# Patient Record
Sex: FEMALE | Race: WHITE | Employment: FULL TIME | URBAN - METROPOLITAN AREA
[De-identification: names, ages, dates, MRNs, and addresses within clinical notes are randomized per-mention and may not be internally consistent; named-entity substitution may affect disease eponyms.]

---

## 2019-05-24 ENCOUNTER — HOSPITAL ENCOUNTER (OUTPATIENT)
Dept: MAMMOGRAPHY | Age: 54
Discharge: HOME | End: 2019-05-26
Payer: COMMERCIAL

## 2019-05-24 ENCOUNTER — HOSPITAL ENCOUNTER (OUTPATIENT)
Dept: ULTRASOUND IMAGING | Age: 54
Discharge: HOME | End: 2019-05-24
Payer: COMMERCIAL

## 2019-05-24 DIAGNOSIS — N63.10 LUMP OF RIGHT BREAST: ICD-10-CM

## 2019-05-24 PROCEDURE — G0279 TOMOSYNTHESIS, MAMMO: HCPCS

## 2019-05-24 PROCEDURE — 76642 ULTRASOUND BREAST LIMITED: CPT

## 2020-07-28 ENCOUNTER — HOSPITAL ENCOUNTER (OUTPATIENT)
Dept: MAMMOGRAPHY | Age: 55
Discharge: HOME | End: 2020-07-30
Payer: COMMERCIAL

## 2020-07-28 PROCEDURE — 77063 BREAST TOMOSYNTHESIS BI: CPT

## 2022-04-22 ENCOUNTER — HOSPITAL ENCOUNTER (OUTPATIENT)
Dept: MAMMOGRAPHY | Age: 57
Discharge: HOME | End: 2022-04-24
Payer: COMMERCIAL

## 2022-04-22 DIAGNOSIS — Z12.31 SCREENING MAMMOGRAM FOR BREAST CANCER: ICD-10-CM

## 2022-04-22 PROCEDURE — 77063 BREAST TOMOSYNTHESIS BI: CPT

## 2022-09-07 ENCOUNTER — APPOINTMENT (OUTPATIENT)
Dept: GENERAL RADIOLOGY | Age: 57
End: 2022-09-07
Payer: COMMERCIAL

## 2022-09-07 ENCOUNTER — APPOINTMENT (OUTPATIENT)
Dept: CT IMAGING | Age: 57
End: 2022-09-07
Payer: COMMERCIAL

## 2022-09-07 ENCOUNTER — HOSPITAL ENCOUNTER (OUTPATIENT)
Age: 57
Setting detail: OBSERVATION
Discharge: HOME | End: 2022-09-09
Attending: EMERGENCY MEDICINE | Admitting: INTERNAL MEDICINE
Payer: COMMERCIAL

## 2022-09-07 DIAGNOSIS — R51.9 NONINTRACTABLE HEADACHE, UNSPECIFIED CHRONICITY PATTERN, UNSPECIFIED HEADACHE TYPE: ICD-10-CM

## 2022-09-07 DIAGNOSIS — R20.2 PARESTHESIAS: Primary | ICD-10-CM

## 2022-09-07 LAB
ANION GAP SERPL CALCULATED.3IONS-SCNC: 10 MMOL/L (ref 7–16)
BUN BLDV-MCNC: 15 MG/DL (ref 6–20)
CALCIUM SERPL-MCNC: 10.3 MG/DL (ref 8.6–10.2)
CHLORIDE BLD-SCNC: 98 MMOL/L (ref 98–107)
CHP ED QC CHECK: NORMAL
CO2: 28 MMOL/L (ref 22–29)
CREAT SERPL-MCNC: 0.7 MG/DL (ref 0.5–1)
GFR AFRICAN AMERICAN: >60
GFR NON-AFRICAN AMERICAN: >60 ML/MIN/1.73
GLUCOSE BLD-MCNC: 109 MG/DL
GLUCOSE BLD-MCNC: 113 MG/DL (ref 74–99)
HCT VFR BLD CALC: 41.6 % (ref 34–48)
HEMOGLOBIN: 14.2 G/DL (ref 11.5–15.5)
INR BLD: 1.2
MCH RBC QN AUTO: 31.3 PG (ref 26–35)
MCHC RBC AUTO-ENTMCNC: 34.1 % (ref 32–34.5)
MCV RBC AUTO: 91.8 FL (ref 80–99.9)
METER GLUCOSE: 109 MG/DL (ref 74–99)
PDW BLD-RTO: 12.3 FL (ref 11.5–15)
PLATELET # BLD: 222 E9/L (ref 130–450)
PMV BLD AUTO: 9.3 FL (ref 7–12)
POTASSIUM SERPL-SCNC: 4.3 MMOL/L (ref 3.5–5)
PROTHROMBIN TIME: 13.8 SEC (ref 9.3–12.4)
RBC # BLD: 4.53 E12/L (ref 3.5–5.5)
SODIUM BLD-SCNC: 136 MMOL/L (ref 132–146)
TROPONIN, HIGH SENSITIVITY: <6 NG/L (ref 0–9)
WBC # BLD: 8.6 E9/L (ref 4.5–11.5)

## 2022-09-07 PROCEDURE — 96374 THER/PROPH/DIAG INJ IV PUSH: CPT

## 2022-09-07 PROCEDURE — G0378 HOSPITAL OBSERVATION PER HR: HCPCS

## 2022-09-07 PROCEDURE — 85027 COMPLETE CBC AUTOMATED: CPT

## 2022-09-07 PROCEDURE — 93005 ELECTROCARDIOGRAM TRACING: CPT | Performed by: EMERGENCY MEDICINE

## 2022-09-07 PROCEDURE — 85610 PROTHROMBIN TIME: CPT

## 2022-09-07 PROCEDURE — 82962 GLUCOSE BLOOD TEST: CPT

## 2022-09-07 PROCEDURE — 84484 ASSAY OF TROPONIN QUANT: CPT

## 2022-09-07 PROCEDURE — 80048 BASIC METABOLIC PNL TOTAL CA: CPT

## 2022-09-07 PROCEDURE — 96375 TX/PRO/DX INJ NEW DRUG ADDON: CPT

## 2022-09-07 PROCEDURE — 6370000000 HC RX 637 (ALT 250 FOR IP): Performed by: INTERNAL MEDICINE

## 2022-09-07 PROCEDURE — 71045 X-RAY EXAM CHEST 1 VIEW: CPT

## 2022-09-07 PROCEDURE — 99285 EMERGENCY DEPT VISIT HI MDM: CPT

## 2022-09-07 PROCEDURE — 70450 CT HEAD/BRAIN W/O DYE: CPT

## 2022-09-07 PROCEDURE — 6360000002 HC RX W HCPCS: Performed by: EMERGENCY MEDICINE

## 2022-09-07 PROCEDURE — 2580000003 HC RX 258: Performed by: INTERNAL MEDICINE

## 2022-09-07 RX ORDER — ENOXAPARIN SODIUM 100 MG/ML
40 INJECTION SUBCUTANEOUS DAILY
Status: DISCONTINUED | OUTPATIENT
Start: 2022-09-08 | End: 2022-09-09 | Stop reason: HOSPADM

## 2022-09-07 RX ORDER — ACETAMINOPHEN 650 MG/1
650 SUPPOSITORY RECTAL EVERY 6 HOURS PRN
Status: DISCONTINUED | OUTPATIENT
Start: 2022-09-07 | End: 2022-09-09 | Stop reason: HOSPADM

## 2022-09-07 RX ORDER — TRAZODONE HYDROCHLORIDE 50 MG/1
100 TABLET ORAL NIGHTLY
Status: DISCONTINUED | OUTPATIENT
Start: 2022-09-07 | End: 2022-09-07 | Stop reason: ALTCHOICE

## 2022-09-07 RX ORDER — ASPIRIN 81 MG/1
324 TABLET, CHEWABLE ORAL ONCE
Status: COMPLETED | OUTPATIENT
Start: 2022-09-07 | End: 2022-09-07

## 2022-09-07 RX ORDER — 0.9 % SODIUM CHLORIDE 0.9 %
1000 INTRAVENOUS SOLUTION INTRAVENOUS ONCE
Status: COMPLETED | OUTPATIENT
Start: 2022-09-07 | End: 2022-09-07

## 2022-09-07 RX ORDER — METHYLPREDNISOLONE SODIUM SUCCINATE 125 MG/2ML
125 INJECTION, POWDER, LYOPHILIZED, FOR SOLUTION INTRAMUSCULAR; INTRAVENOUS ONCE
Status: COMPLETED | OUTPATIENT
Start: 2022-09-07 | End: 2022-09-07

## 2022-09-07 RX ORDER — SODIUM CHLORIDE 0.9 % (FLUSH) 0.9 %
5-40 SYRINGE (ML) INJECTION EVERY 12 HOURS SCHEDULED
Status: DISCONTINUED | OUTPATIENT
Start: 2022-09-07 | End: 2022-09-09 | Stop reason: HOSPADM

## 2022-09-07 RX ORDER — METOCLOPRAMIDE HYDROCHLORIDE 5 MG/ML
10 INJECTION INTRAMUSCULAR; INTRAVENOUS ONCE
Status: COMPLETED | OUTPATIENT
Start: 2022-09-07 | End: 2022-09-07

## 2022-09-07 RX ORDER — ACETAMINOPHEN 325 MG/1
650 TABLET ORAL EVERY 6 HOURS PRN
Status: DISCONTINUED | OUTPATIENT
Start: 2022-09-07 | End: 2022-09-09 | Stop reason: HOSPADM

## 2022-09-07 RX ORDER — POLYETHYLENE GLYCOL 3350 17 G/17G
17 POWDER, FOR SOLUTION ORAL DAILY PRN
Status: DISCONTINUED | OUTPATIENT
Start: 2022-09-07 | End: 2022-09-09 | Stop reason: HOSPADM

## 2022-09-07 RX ORDER — ONDANSETRON 4 MG/1
4 TABLET, ORALLY DISINTEGRATING ORAL EVERY 8 HOURS PRN
Status: DISCONTINUED | OUTPATIENT
Start: 2022-09-07 | End: 2022-09-09 | Stop reason: HOSPADM

## 2022-09-07 RX ORDER — PHENOL 1.4 %
10 AEROSOL, SPRAY (ML) MUCOUS MEMBRANE AS NEEDED
COMMUNITY

## 2022-09-07 RX ORDER — SODIUM CHLORIDE 9 MG/ML
INJECTION, SOLUTION INTRAVENOUS PRN
Status: DISCONTINUED | OUTPATIENT
Start: 2022-09-07 | End: 2022-09-09 | Stop reason: HOSPADM

## 2022-09-07 RX ORDER — DULOXETIN HYDROCHLORIDE 60 MG/1
60 CAPSULE, DELAYED RELEASE ORAL 2 TIMES DAILY
Status: DISCONTINUED | OUTPATIENT
Start: 2022-09-07 | End: 2022-09-09 | Stop reason: HOSPADM

## 2022-09-07 RX ORDER — DIPHENHYDRAMINE HYDROCHLORIDE 50 MG/ML
25 INJECTION INTRAMUSCULAR; INTRAVENOUS ONCE
Status: COMPLETED | OUTPATIENT
Start: 2022-09-07 | End: 2022-09-07

## 2022-09-07 RX ORDER — SODIUM CHLORIDE 0.9 % (FLUSH) 0.9 %
5-40 SYRINGE (ML) INJECTION PRN
Status: DISCONTINUED | OUTPATIENT
Start: 2022-09-07 | End: 2022-09-09 | Stop reason: HOSPADM

## 2022-09-07 RX ORDER — ONDANSETRON 2 MG/ML
4 INJECTION INTRAMUSCULAR; INTRAVENOUS EVERY 6 HOURS PRN
Status: DISCONTINUED | OUTPATIENT
Start: 2022-09-07 | End: 2022-09-09 | Stop reason: HOSPADM

## 2022-09-07 RX ORDER — BUPROPION HYDROCHLORIDE 150 MG/1
150 TABLET ORAL EVERY MORNING
Status: DISCONTINUED | OUTPATIENT
Start: 2022-09-08 | End: 2022-09-07 | Stop reason: ALTCHOICE

## 2022-09-07 RX ORDER — DEXTROSE MONOHYDRATE 100 MG/ML
INJECTION, SOLUTION INTRAVENOUS CONTINUOUS PRN
Status: DISCONTINUED | OUTPATIENT
Start: 2022-09-07 | End: 2022-09-09 | Stop reason: HOSPADM

## 2022-09-07 RX ADMIN — SODIUM CHLORIDE 1000 ML: 9 INJECTION, SOLUTION INTRAVENOUS at 18:59

## 2022-09-07 RX ADMIN — DIPHENHYDRAMINE HYDROCHLORIDE 25 MG: 50 INJECTION, SOLUTION INTRAMUSCULAR; INTRAVENOUS at 17:42

## 2022-09-07 RX ADMIN — METOCLOPRAMIDE 10 MG: 5 INJECTION, SOLUTION INTRAMUSCULAR; INTRAVENOUS at 17:42

## 2022-09-07 RX ADMIN — METHYLPREDNISOLONE SODIUM SUCCINATE 125 MG: 125 INJECTION, POWDER, FOR SOLUTION INTRAMUSCULAR; INTRAVENOUS at 17:42

## 2022-09-07 RX ADMIN — ASPIRIN 81 MG CHEWABLE TABLET 324 MG: 81 TABLET CHEWABLE at 18:59

## 2022-09-07 ASSESSMENT — ENCOUNTER SYMPTOMS
SORE THROAT: 0
TROUBLE SWALLOWING: 0
EYE PAIN: 0
EYE REDNESS: 0
COUGH: 0
WHEEZING: 0
DIARRHEA: 0
EYE DISCHARGE: 0
VOMITING: 0
ABDOMINAL DISTENTION: 0
SINUS PRESSURE: 0
SHORTNESS OF BREATH: 0
NAUSEA: 0
BACK PAIN: 0

## 2022-09-07 ASSESSMENT — PAIN - FUNCTIONAL ASSESSMENT
PAIN_FUNCTIONAL_ASSESSMENT: 0-10
PAIN_FUNCTIONAL_ASSESSMENT: ACTIVITIES ARE NOT PREVENTED

## 2022-09-07 ASSESSMENT — PAIN DESCRIPTION - LOCATION
LOCATION: HEAD
LOCATION: FACE;HEAD;EAR

## 2022-09-07 ASSESSMENT — PAIN DESCRIPTION - ONSET: ONSET: ON-GOING

## 2022-09-07 ASSESSMENT — PAIN DESCRIPTION - DESCRIPTORS: DESCRIPTORS: ACHING;DULL

## 2022-09-07 ASSESSMENT — PAIN DESCRIPTION - PAIN TYPE: TYPE: ACUTE PAIN

## 2022-09-07 ASSESSMENT — PAIN SCALES - GENERAL
PAINLEVEL_OUTOF10: 3
PAINLEVEL_OUTOF10: 7

## 2022-09-07 ASSESSMENT — PAIN DESCRIPTION - FREQUENCY: FREQUENCY: CONTINUOUS

## 2022-09-07 ASSESSMENT — PAIN DESCRIPTION - ORIENTATION: ORIENTATION: LEFT;ANTERIOR

## 2022-09-07 NOTE — H&P
Department of Internal Medicine  History and Physical    PCP: Madan Ch MD  Admitting Physician: Dr. Yoly Pope  Consultants:   Date of Service: 2022    CHIEF COMPLAINT:  numbness/pain    HISTORY OF PRESENT ILLNESS:    Patient is 66-year-old female who presented to the ED due to left-sided facial pain and numbness. Patient states that at work today she noticed left-sided facial pain and numbness. On exam it continues. She denies any loss of vision or change in vision. She does admit to associated headache. She does have a chronic intermittent left-sided and right-sided extremity numbness and pain but that is related to her fibromyalgia. PAST MEDICAL Hx:  Past Medical History:   Diagnosis Date    Endometriosis     Fibromyalgia     Gastric reflux     Neuropathy        PAST SURGICAL Hx:   Past Surgical History:   Procedure Laterality Date     SECTION      x 2    COLONOSCOPY      HYSTERECTOMY  13    laparoscopic robotic assisted cystectomy    LAPAROSCOPY      OTHER SURGICAL HISTORY  2015    I & D left labial abcess    TUBAL LIGATION      UPPER GASTROINTESTINAL ENDOSCOPY         FAMILY Hx:  Family History   Problem Relation Age of Onset    Breast Cancer Mother 36    Ovarian Cancer Maternal Grandmother        HOME MEDICATIONS:  Prior to Admission medications    Medication Sig Start Date End Date Taking? Authorizing Provider   OMEPRAZOLE PO Take by mouth    Historical Provider, MD   buPROPion (WELLBUTRIN XL) 150 MG extended release tablet Take 150 mg by mouth every morning    Historical Provider, MD   traZODone (DESYREL) 100 MG tablet Take 100 mg by mouth nightly    Historical Provider, MD   ibuprofen (ADVIL;MOTRIN) 800 MG tablet Take 1 tablet by mouth every 6 hours as needed for Pain 12/2/15   Elder Denis MD   DULoxetine (CYMBALTA) 60 MG capsule Take 90 mg by mouth daily     Historical Provider, MD       ALLERGIES:  Patient has no known allergies.     SOCIAL Hx:  Social History noted  VITALS:  Vitals:    09/07/22 1700   BP: (!) 162/74   Pulse: 90   Resp: 18   Temp:    SpO2: 98%         CONSTITUTIONAL:    Awake, alert, cooperative, no apparent distress, and appears stated age    EYES:    EOMI, sclera clear, conjunctiva normal    ENT:    Normocephalic, atraumatic,  External ears without lesions. NECK:    Supple, symmetrical, trachea midline, no JVD    HEMATOLOGIC/LYMPHATICS:    No cervical lymphadenopathy and no supraclavicular lymphadenopathy    LUNGS:    Symmetric. No increased work of breathing, good air exchange, clear to auscultation bilaterally, no wheezes, rhonchi, or rales,     CARDIOVASCULAR:    Normal apical impulse, regular rate and rhythm, normal S1 and S2, no S3 or S4, and no murmur noted    ABDOMEN:    soft, non-distended, non-tender    MUSCULOSKELETAL:    There is no redness, warmth, or swelling of the joints. NEUROLOGIC:    Awake, alert, oriented to name, place and time. Patient has decreased sensation left side of face  Patient's tongue deviates to the right    SKIN:    No bruising or bleeding. No redness, warmth, or swelling    EXTREMITIES:    Peripheral pulses present. No edema, cyanosis, or swelling.     LINES/CATHETERS     LABORATORY DATA:  CBC with Differential:    Lab Results   Component Value Date/Time    WBC 8.6 09/07/2022 04:26 PM    RBC 4.53 09/07/2022 04:26 PM    HGB 14.2 09/07/2022 04:26 PM    HCT 41.6 09/07/2022 04:26 PM     09/07/2022 04:26 PM    MCV 91.8 09/07/2022 04:26 PM    MCH 31.3 09/07/2022 04:26 PM    MCHC 34.1 09/07/2022 04:26 PM    RDW 12.3 09/07/2022 04:26 PM    SEGSPCT 67 08/02/2013 10:40 AM    LYMPHOPCT 26 08/02/2013 10:40 AM    MONOPCT 5 08/02/2013 10:40 AM    BASOPCT 1 08/02/2013 10:40 AM    MONOSABS 0.31 08/02/2013 10:40 AM    LYMPHSABS 1.70 08/02/2013 10:40 AM    EOSABS 0.15 08/02/2013 10:40 AM    BASOSABS 0.03 08/02/2013 10:40 AM     CMP:    Lab Results   Component Value Date/Time     09/07/2022 04:26 PM    K 4.3 09/07/2022 04:26 PM    CL 98 09/07/2022 04:26 PM    CO2 28 09/07/2022 04:26 PM    BUN 15 09/07/2022 04:26 PM    CREATININE 0.7 09/07/2022 04:26 PM    GFRAA >60 09/07/2022 04:26 PM    LABGLOM >60 09/07/2022 04:26 PM    GLUCOSE 109 09/07/2022 04:42 PM    PROT 7.8 12/02/2015 12:42 PM    LABALBU 4.5 12/02/2015 12:42 PM    CALCIUM 10.3 09/07/2022 04:26 PM    BILITOT 0.3 12/02/2015 12:42 PM    ALKPHOS 84 12/02/2015 12:42 PM    AST 21 12/02/2015 12:42 PM    ALT 46 12/02/2015 12:42 PM       ASSESSMENT/PLAN:  Strokelike symptoms  Fibromyalgia  Endometriosis  Gastric reflux    Patient appears to have CVA. Neuro exam suggest multiple neurodeficits. Patient could also have migraine headache with neurological symptoms. Patient will be worked up for stroke. Echocardiogram, carotid ultrasound, MRI brain will be ordered. Neurology will be consulted. PT OT consulted. Speech therapy consulted. Continue aspirin. Patient will be given IV fluid bolus.       Bev Harvey, [address] [place] [address] Avenue  7:06 PM  9/7/2022    Electronically signed by Bev Harvey DO on 9/7/22 at 7:06 PM EDT

## 2022-09-07 NOTE — ED PROVIDER NOTES
Patient reports that about 56 today, while at work, she noticed left-sided facial numbness. She states this progressed to bilateral ear pain. Over the next half hour or so she noticed progression to include numbness to the left arm and left leg. No weakness. No difficulty ambulating. No chest pain, shortness of breath, nausea, vomiting, fever, chills or other complaints. She relates she does have history of occasional headaches as well as fibromyalgia but, has never had symptoms like these. The history is provided by the patient. Cerebrovascular Accident  Presenting symptoms: headaches and sensory loss    Presenting symptoms: no weakness    Onset quality:  Sudden  Last known well:  1300  Duration:  6 hours  Timing:  Constant  Progression:  Unchanged  Similar to previous episodes: no    Associated symptoms: no chest pain, no difficulty swallowing, no dizziness, no fall, no fever, no hearing loss, no bladder incontinence, no nausea, no neck pain, no seizures, no tinnitus, no vertigo and no vomiting       Review of Systems   Constitutional:  Negative for activity change, appetite change, chills and fever. HENT:  Positive for ear pain. Negative for hearing loss, sinus pressure, sore throat, tinnitus and trouble swallowing. Eyes:  Negative for pain, discharge and redness. Respiratory:  Negative for cough, shortness of breath and wheezing. Cardiovascular:  Negative for chest pain. Gastrointestinal:  Negative for abdominal distention, diarrhea, nausea and vomiting. Genitourinary:  Negative for bladder incontinence, dysuria and frequency. Musculoskeletal:  Negative for arthralgias, back pain and neck pain. Skin:  Negative for rash and wound. Neurological:  Positive for numbness and headaches. Negative for dizziness, vertigo, tremors, seizures, syncope, facial asymmetry, speech difficulty, weakness and light-headedness. Hematological:  Negative for adenopathy.    All other systems reviewed and are negative. Physical Exam  Vitals and nursing note reviewed. Constitutional:       Appearance: She is well-developed. HENT:      Head: Normocephalic and atraumatic. Eyes:      General: No visual field deficit. Pupils: Pupils are equal, round, and reactive to light. Cardiovascular:      Rate and Rhythm: Normal rate and regular rhythm. Heart sounds: Normal heart sounds. No murmur heard. Pulmonary:      Effort: Pulmonary effort is normal. No respiratory distress. Breath sounds: Normal breath sounds. No wheezing or rales. Abdominal:      General: Bowel sounds are normal.      Palpations: Abdomen is soft. Tenderness: There is no abdominal tenderness. There is no guarding or rebound. Musculoskeletal:      Cervical back: Normal range of motion and neck supple. Skin:     General: Skin is warm and dry. Neurological:      Mental Status: She is alert and oriented to person, place, and time. Cranial Nerves: No dysarthria or facial asymmetry. Sensory: Sensory deficit present. Motor: No weakness or abnormal muscle tone. Coordination: Coordination normal.      Gait: Gait normal.      Comments: Slight decrease in sensation to the left arm, left leg and left face. However, no weakness. Gait is demonstrated and is without difficulty.  strength is equal.    See NIH below        Procedures     MDM     EKG: This EKG is signed and interpreted by me. Rate: 80  Rhythm: Sinus  Interpretation: no acute changes  Comparison: stable as compared to patient's most recent EKG of 11/8/17. Last known well time: 1030 hrs  NIH Stroke Scale at time of initial evaluation: 1620  1A: Level of Consciousness 0 - alert; keenly responsive   1B: Ask Month and Age 0 - answers both questions correctly   1C:  Tell Patient To Open and Close Eyes, then Hand  Squeeze 0 - performs both tasks correctly   2: Test Horizontal Extraocular Movements 0 - normal   3: Test Visual Fields 0 - no visual loss   4: Test Facial Palsy 0 - normal symmetric movement   5A: Test Left Arm Motor Drift 0 - no drift, limb holds 90 (or 45) degrees for full 10 seconds   5B: Test Right Arm Motor Drift 0 - no drift, limb holds 90 (or 45) degrees for full 10 seconds   6A: Test Left Leg Motor Drift 0 - no drift; leg holds 30 degree position for full 5 seconds   6B: Test Right Leg Motor Drift 0 - no drift; leg holds 30 degree position for full 5 seconds   7: Test Limb Ataxia   (FNF/Heel-Shin) 0 - absent   8: Test Sensation 1 - mild to moderate sensory loss; patient feels pinprick is less sharp or is dull on the affected side; there is a loss of superficial pain with pinprick but patient is aware of being touched    9: Test Language/Aphasia 0 - no aphasia, normal   10: Test Dysarthria 0 - normal   11: Test Extinction/Inattention 0 - no abnormality   Total NIH Stroke Score: 1     tPA Criteria*  Inclusion criteria:  - Ischemic stroke onset within 3 hours of drug administration  - Age 25 or older  - No hemorrhage or non-stroke cause of deficit on CT  - Measurable deficit on NIH Stroke Scale    Exclusion criteria: If the patient. ...  - has minor or improving symptoms  - had seizure at onset of stroke  - has had another stroke or serious head trauma within the last 3 months  - has had major surgery within the last 14 days  - has known history of intracranial hemorrhage  - has sustained systolic blood pressure >272 mmHg  - has sustained diastolic blood pressure >481 mmHg  - requires aggressive treatment is necessary to lower their blood pressure  - has symptoms suggestive of subarachnoid hemorrhage  - has had GI or urinary tract hemorrhage within the last 21 days  - has had an arterial puncture at a non-compressible site within the last 7 days  - received heparin within the last 48 hours and has an elevated PTT  - has a prothrombin time (PT) >15 seconds  - has a platelet count <766,102 uL  - serum blood glucose is <50 mg/dL or >400 mg/dL    Relative Contraindications:  - NIH Stroke score >22  - Patient's CT shows evidence of large MCA territory infarction (>1/3 the MCA territory)    *[facility] Policy Paper      Acute CVA Core Measures:     - t-PA Eligibility: IV t-PA was considered and not given due to violations in inclusion criteria including stroke onset was greater than 3 hours prior to presentation and mild/rapidly resolving deficit    5:31 PM EDT  Patient reports generalized headache as well as other symptoms are unchanged. Medications ordered. 6:19 PM EDT  Patient states headache is down to 2 from her initial 7. However, decreased sensation to left arm and left leg is unchanged. NIH unchanged. Will discuss with primary care for admission and further evaluation. 6:26 PM EDT  Discussed with Dr. Dagoberto London for Dr. Majo Casillas. Okay for admission.    --------------------------------------------- PAST HISTORY ---------------------------------------------  Past Medical History:  has a past medical history of Endometriosis, Fibromyalgia, Gastric reflux, and Neuropathy. Past Surgical History:  has a past surgical history that includes Tubal ligation;  section; laparoscopy; Colonoscopy; Upper gastrointestinal endoscopy; Hysterectomy (13); and other surgical history (2015). Social History:  reports current alcohol use. She reports that she does not use drugs. Family History: family history includes Breast Cancer (age of onset: 36) in her mother; Ovarian Cancer in her maternal grandmother. The patients home medications have been reviewed. Allergies: Patient has no known allergies.     -------------------------------------------------- RESULTS -------------------------------------------------    LABS:  Results for orders placed or performed during the hospital encounter of 83/64/15   Basic metabolic panel   Result Value Ref Range    Sodium 136 132 - 146 mmol/L    Potassium 4.3 3.5 - 5.0 mmol/L    Chloride 98 98 - 107 mmol/L    CO2 28 22 - 29 mmol/L    Anion Gap 10 7 - 16 mmol/L    Glucose 113 (H) 74 - 99 mg/dL    BUN 15 6 - 20 mg/dL    Creatinine 0.7 0.5 - 1.0 mg/dL    GFR Non-African American >60 >=60 mL/min/1.73    GFR African American >60     Calcium 10.3 (H) 8.6 - 10.2 mg/dL   CBC   Result Value Ref Range    WBC 8.6 4.5 - 11.5 E9/L    RBC 4.53 3.50 - 5.50 E12/L    Hemoglobin 14.2 11.5 - 15.5 g/dL    Hematocrit 41.6 34.0 - 48.0 %    MCV 91.8 80.0 - 99.9 fL    MCH 31.3 26.0 - 35.0 pg    MCHC 34.1 32.0 - 34.5 %    RDW 12.3 11.5 - 15.0 fL    Platelets 202 889 - 587 E9/L    MPV 9.3 7.0 - 12.0 fL   Troponin   Result Value Ref Range    Troponin, High Sensitivity <6 0 - 9 ng/L   Protime-INR   Result Value Ref Range    Protime 13.8 (H) 9.3 - 12.4 sec    INR 1.2    POCT Glucose   Result Value Ref Range    Glucose 109 mg/dL    QC OK? ok    POCT Glucose   Result Value Ref Range    Meter Glucose 109 (H) 74 - 99 mg/dL       RADIOLOGY:  XR CHEST 1 VIEW   Final Result   No pneumonia or pleural effusion. CT HEAD WO CONTRAST   Final Result   No acute intracranial abnormality.               ------------------------- NURSING NOTES AND VITALS REVIEWED ---------------------------  Date / Time Roomed:  9/7/2022  4:04 PM  ED Bed Assignment:  21/21    The nursing notes within the ED encounter and vital signs as below have been reviewed. Patient Vitals for the past 24 hrs:   BP Temp Pulse Resp SpO2 Height Weight   09/07/22 1700 (!) 162/74 -- 90 18 98 % -- --   09/07/22 1556 (!) 168/77 98.9 °F (37.2 °C) 99 18 98 % 5' 6\" (1.676 m) 182 lb (82.6 kg)       Oxygen Saturation Interpretation: Normal    ------------------------------------------ PROGRESS NOTES ------------------------------------------  Re-evaluation(s):  See above    Counseling:  I have spoken with the patient and discussed todays results, in addition to providing specific details for the plan of care and counseling regarding the diagnosis and prognosis.   Their questions are answered at this time and they are agreeable with the plan of admission.    --------------------------------- ADDITIONAL PROVIDER NOTES ---------------------------------  Consultations:  See above  This patient's ED course included: a personal history and physicial examination, multiple bedside re-evaluations, IV medications, cardiac monitoring, and continuous pulse oximetry    This patient has remained hemodynamically stable during their ED course. Diagnosis:  1. Paresthesias    2. Nonintractable headache, unspecified chronicity pattern, unspecified headache type        Disposition:  Patient's disposition: Admit to med/surg floor  Patient's condition is stable.          Ever Hicks DO  09/07/22 1827

## 2022-09-08 ENCOUNTER — APPOINTMENT (OUTPATIENT)
Dept: GENERAL RADIOLOGY | Age: 57
End: 2022-09-08
Payer: COMMERCIAL

## 2022-09-08 ENCOUNTER — APPOINTMENT (OUTPATIENT)
Dept: MRI IMAGING | Age: 57
End: 2022-09-08
Payer: COMMERCIAL

## 2022-09-08 ENCOUNTER — APPOINTMENT (OUTPATIENT)
Dept: ULTRASOUND IMAGING | Age: 57
End: 2022-09-08
Payer: COMMERCIAL

## 2022-09-08 PROBLEM — R29.90 STROKE-LIKE SYMPTOMS: Status: ACTIVE | Noted: 2022-09-08

## 2022-09-08 LAB
ALBUMIN SERPL-MCNC: 4.5 G/DL (ref 3.5–5.2)
ALP BLD-CCNC: 63 U/L (ref 35–104)
ALT SERPL-CCNC: 24 U/L (ref 0–32)
ANION GAP SERPL CALCULATED.3IONS-SCNC: 11 MMOL/L (ref 7–16)
AST SERPL-CCNC: 15 U/L (ref 0–31)
BASOPHILS ABSOLUTE: 0.02 E9/L (ref 0–0.2)
BASOPHILS RELATIVE PERCENT: 0.3 % (ref 0–2)
BILIRUB SERPL-MCNC: 0.5 MG/DL (ref 0–1.2)
BUN BLDV-MCNC: 11 MG/DL (ref 6–20)
C-REACTIVE PROTEIN: 0.8 MG/DL (ref 0–0.4)
CALCIUM SERPL-MCNC: 10.2 MG/DL (ref 8.6–10.2)
CHLORIDE BLD-SCNC: 103 MMOL/L (ref 98–107)
CHOLESTEROL, TOTAL: 223 MG/DL (ref 0–199)
CO2: 25 MMOL/L (ref 22–29)
CREAT SERPL-MCNC: 0.6 MG/DL (ref 0.5–1)
EOSINOPHILS ABSOLUTE: 0.01 E9/L (ref 0.05–0.5)
EOSINOPHILS RELATIVE PERCENT: 0.1 % (ref 0–6)
GFR AFRICAN AMERICAN: >60
GFR NON-AFRICAN AMERICAN: >60 ML/MIN/1.73
GLUCOSE BLD-MCNC: 147 MG/DL (ref 74–99)
HBA1C MFR BLD: 5.4 % (ref 4–5.6)
HCT VFR BLD CALC: 40.3 % (ref 34–48)
HDLC SERPL-MCNC: 74 MG/DL
HEMOGLOBIN: 14.1 G/DL (ref 11.5–15.5)
IMMATURE GRANULOCYTES #: 0.01 E9/L
IMMATURE GRANULOCYTES %: 0.1 % (ref 0–5)
LDL CHOLESTEROL CALCULATED: 137 MG/DL (ref 0–99)
LV EF: 76 %
LVEF MODALITY: NORMAL
LYMPHOCYTES ABSOLUTE: 1.35 E9/L (ref 1.5–4)
LYMPHOCYTES RELATIVE PERCENT: 17.2 % (ref 20–42)
MAGNESIUM: 2.4 MG/DL (ref 1.6–2.6)
MCH RBC QN AUTO: 31.8 PG (ref 26–35)
MCHC RBC AUTO-ENTMCNC: 35 % (ref 32–34.5)
MCV RBC AUTO: 90.8 FL (ref 80–99.9)
MONOCYTES ABSOLUTE: 0.27 E9/L (ref 0.1–0.95)
MONOCYTES RELATIVE PERCENT: 3.4 % (ref 2–12)
NEUTROPHILS ABSOLUTE: 6.19 E9/L (ref 1.8–7.3)
NEUTROPHILS RELATIVE PERCENT: 78.9 % (ref 43–80)
PDW BLD-RTO: 12.1 FL (ref 11.5–15)
PHOSPHORUS: 2.9 MG/DL (ref 2.5–4.5)
PLATELET # BLD: 220 E9/L (ref 130–450)
PMV BLD AUTO: 9.6 FL (ref 7–12)
POTASSIUM SERPL-SCNC: 3.6 MMOL/L (ref 3.5–5)
PROCALCITONIN: <0.02 NG/ML (ref 0–0.08)
RBC # BLD: 4.44 E12/L (ref 3.5–5.5)
SEDIMENTATION RATE, ERYTHROCYTE: 6 MM/HR (ref 0–20)
SODIUM BLD-SCNC: 139 MMOL/L (ref 132–146)
T4 FREE: 1.05 NG/DL (ref 0.93–1.7)
TOTAL PROTEIN: 6.9 G/DL (ref 6.4–8.3)
TRIGL SERPL-MCNC: 62 MG/DL (ref 0–149)
TSH SERPL DL<=0.05 MIU/L-ACNC: 0.53 UIU/ML (ref 0.27–4.2)
VLDLC SERPL CALC-MCNC: 12 MG/DL
WBC # BLD: 7.9 E9/L (ref 4.5–11.5)

## 2022-09-08 PROCEDURE — 96372 THER/PROPH/DIAG INJ SC/IM: CPT

## 2022-09-08 PROCEDURE — 92610 EVALUATE SWALLOWING FUNCTION: CPT

## 2022-09-08 PROCEDURE — 92526 ORAL FUNCTION THERAPY: CPT

## 2022-09-08 PROCEDURE — 36415 COLL VENOUS BLD VENIPUNCTURE: CPT

## 2022-09-08 PROCEDURE — 80053 COMPREHEN METABOLIC PANEL: CPT

## 2022-09-08 PROCEDURE — 85025 COMPLETE CBC W/AUTO DIFF WBC: CPT

## 2022-09-08 PROCEDURE — 86140 C-REACTIVE PROTEIN: CPT

## 2022-09-08 PROCEDURE — 93306 TTE W/DOPPLER COMPLETE: CPT

## 2022-09-08 PROCEDURE — 84100 ASSAY OF PHOSPHORUS: CPT

## 2022-09-08 PROCEDURE — 83036 HEMOGLOBIN GLYCOSYLATED A1C: CPT

## 2022-09-08 PROCEDURE — 70551 MRI BRAIN STEM W/O DYE: CPT

## 2022-09-08 PROCEDURE — 80061 LIPID PANEL: CPT

## 2022-09-08 PROCEDURE — 84145 PROCALCITONIN (PCT): CPT

## 2022-09-08 PROCEDURE — 83735 ASSAY OF MAGNESIUM: CPT

## 2022-09-08 PROCEDURE — G0378 HOSPITAL OBSERVATION PER HR: HCPCS

## 2022-09-08 PROCEDURE — 93880 EXTRACRANIAL BILAT STUDY: CPT

## 2022-09-08 PROCEDURE — 72141 MRI NECK SPINE W/O DYE: CPT

## 2022-09-08 PROCEDURE — 6370000000 HC RX 637 (ALT 250 FOR IP): Performed by: INTERNAL MEDICINE

## 2022-09-08 PROCEDURE — 84443 ASSAY THYROID STIM HORMONE: CPT

## 2022-09-08 PROCEDURE — 97165 OT EVAL LOW COMPLEX 30 MIN: CPT

## 2022-09-08 PROCEDURE — 2580000003 HC RX 258: Performed by: INTERNAL MEDICINE

## 2022-09-08 PROCEDURE — 84439 ASSAY OF FREE THYROXINE: CPT

## 2022-09-08 PROCEDURE — 6360000002 HC RX W HCPCS: Performed by: INTERNAL MEDICINE

## 2022-09-08 PROCEDURE — 85651 RBC SED RATE NONAUTOMATED: CPT

## 2022-09-08 PROCEDURE — 72040 X-RAY EXAM NECK SPINE 2-3 VW: CPT

## 2022-09-08 RX ORDER — ASPIRIN 81 MG/1
81 TABLET, CHEWABLE ORAL DAILY
Status: DISCONTINUED | OUTPATIENT
Start: 2022-09-08 | End: 2022-09-09 | Stop reason: HOSPADM

## 2022-09-08 RX ORDER — ATORVASTATIN CALCIUM 40 MG/1
40 TABLET, FILM COATED ORAL NIGHTLY
Status: DISCONTINUED | OUTPATIENT
Start: 2022-09-08 | End: 2022-09-09 | Stop reason: HOSPADM

## 2022-09-08 RX ORDER — KETOROLAC TROMETHAMINE 30 MG/ML
30 INJECTION, SOLUTION INTRAMUSCULAR; INTRAVENOUS EVERY 6 HOURS PRN
Status: DISCONTINUED | OUTPATIENT
Start: 2022-09-08 | End: 2022-09-09 | Stop reason: HOSPADM

## 2022-09-08 RX ADMIN — ENOXAPARIN SODIUM 40 MG: 100 INJECTION SUBCUTANEOUS at 09:41

## 2022-09-08 RX ADMIN — ACETAMINOPHEN 650 MG: 325 TABLET ORAL at 09:40

## 2022-09-08 RX ADMIN — SODIUM CHLORIDE, PRESERVATIVE FREE 10 ML: 5 INJECTION INTRAVENOUS at 20:22

## 2022-09-08 RX ADMIN — ACETAMINOPHEN 650 MG: 325 TABLET ORAL at 17:27

## 2022-09-08 RX ADMIN — DULOXETINE HYDROCHLORIDE 60 MG: 60 CAPSULE, DELAYED RELEASE ORAL at 20:22

## 2022-09-08 RX ADMIN — SODIUM CHLORIDE, PRESERVATIVE FREE 10 ML: 5 INJECTION INTRAVENOUS at 06:10

## 2022-09-08 RX ADMIN — SODIUM CHLORIDE, PRESERVATIVE FREE 10 ML: 5 INJECTION INTRAVENOUS at 09:41

## 2022-09-08 RX ADMIN — DULOXETINE HYDROCHLORIDE 60 MG: 60 CAPSULE, DELAYED RELEASE ORAL at 09:41

## 2022-09-08 ASSESSMENT — PAIN DESCRIPTION - LOCATION
LOCATION: EAR;HEAD
LOCATION: EAR;HEAD

## 2022-09-08 ASSESSMENT — PAIN SCALES - GENERAL
PAINLEVEL_OUTOF10: 5
PAINLEVEL_OUTOF10: 0
PAINLEVEL_OUTOF10: 5
PAINLEVEL_OUTOF10: 4

## 2022-09-08 NOTE — CARE COORDINATION
9/8/2022 1220 CM note: No covid testing. Met with patient for transition of care needs. Pt is independent, employed and drives. PCP is Dr Kenyon Nielsen and uses [facility]. Plan is for patient to return home at d/c and she drove to hospital. No needs identified at this time.  Omid KEY

## 2022-09-08 NOTE — PROGRESS NOTES
SPEECH LANGUAGE PATHOLOGY  DAILY PROGRESS NOTE        PATIENT NAME:  Tremayne Iglesias      :  1965          TODAY'S DATE:  2022 ROOM:  [address] [place] [address]    Patient seen for dysphagia mgmt. SLP reviewed diet recommendation of regular textures and thin liquids- may use straws. Patient educated on use of small bites/sips, slow rate, alt solids/liquids, and use of lingual sweep to clear residuals in left buccal cavity. Patient reported pain with swallowing during breakfast, but did not report pain w/ swallowing during SLP trials this date. Will cont w/ POC. Patient verbalized understanding and agreement w/ POC.        CPT code(s) 78988  dysphagia tx  Total minutes :  10 minutes    Jessica Howe M.S., CCC-SLP  Speech-Language Pathologist  Alex [phone]

## 2022-09-08 NOTE — PROGRESS NOTES
[facility]        Date:2022                                                  Patient Name: Cristofer El    MRN: [id number]    : 1965    Room: [address] [place], [place] [id number]      Evaluating OT: Sirisha Jackson OTR/L; [id number]     Referring Provider and Specific Provider Orders/Date:      22  OT eval and treat  Start:  22,   End:  22,   ONE TIME,   Standing Count:  1 Occurrences,   [address], DO      Placement Recommendation: Home with no skilled occupational therapy needed after discharge from inpatient. Diagnosis:   1. Paresthesias    2. Nonintractable headache, unspecified chronicity pattern, unspecified headache type         Surgery: none       Pertinent Medical History:       Past Medical History:   Diagnosis Date    Endometriosis     Fibromyalgia     Gastric reflux     Neuropathy          Past Surgical History:   Procedure Laterality Date     SECTION      x 2    COLONOSCOPY      HYSTERECTOMY  13    laparoscopic robotic assisted cystectomy    LAPAROSCOPY      OTHER SURGICAL HISTORY  2015    I & D left labial abcess    TUBAL LIGATION      UPPER GASTROINTESTINAL ENDOSCOPY        Precautions:  Fall Risk, numbness in L side of face and L ear pain     MRI BRAIN WO CONTRAST 2022  Unremarkable exam.  No acute ischemia.       Assessment of current deficits: none        [] Functional mobility  []ADLs  [] Strength               []Cognition    [] Functional transfers   [] IADLs         [] Safety Awareness   []Endurance    [] Fine Coordination              [] Balance      [] Vision/perception   []Sensation     []Gross Motor Coordination  [] ROM  [] Delirium                   [] Motor Control     OT PLAN OF CARE   OT POC based on physician orders, patient diagnosis and results of clinical assessment    Frequency/Duration 1-3 days/wk for 2 weeks PRN     Specific OT Treatment Interventions to include: none, discharging pt from OT    Recommended Adaptive Equipment: none      Home Living: with 2 children (14 and 20 years); single family home, 1 story, 2 steps to enter back door, tub shower. Equipment owned: none     Prior Level of Function: Independent with ADLs , Independent with IADLs; ambulated with  no device    Driving: yes   Occupation: teacher     Pain Level: 3/10 pain in L ear; Nursing notified. Cognition: A&O: 4/4; Follows 3 step directions   Memory: good    Sequencing: good    Problem solving: good    Judgement/safety: good     [facility]   AM-PAC Daily Activity Inpatient   How much help for putting on and taking off regular lower body clothing?: None  How much help for Bathing?: None  How much help for Toileting?: None  How much help for putting on and taking off regular upper body clothing?: None  How much help for taking care of personal grooming?: None  How much help for eating meals?: None  AM-[facility] Inpatient Daily Activity Raw Score: 24  AM-PAC Inpatient ADL T-Scale Score : 57.54  ADL Inpatient CMS 0-100% Score: 0  ADL Inpatient CMS G-Code Modifier : CH     Functional Assessment:    Initial Eval Status  Date: 9/8/22   Feeding Independent    Grooming Independent    UB Dressing Independent to don outer gown   LB Dressing Independent to adjust and pull socks up while seated EOB   Bathing Independent   Toileting Independent for hygiene and to stand at sink level to wash and dry hands. Bed Mobility  Supine to sit: Independent   Sit to supine: Independent    Functional Transfers Independent from EOB. Independent to and from low commode. Transfer training with verbal cues for hand placement throughout session to improve safety. Functional Mobility Independent with no device to and from bathroom and in hallway.     Balance Sitting:     Static: good     Dynamic: good   Standing: good  with no device   Activity Tolerance Good Visual/  Perceptual Glasses: yes, but needs new glasses             Hand Dominance: left      AROM (PROM) Strength Additional Info:    RUE  WFL 5/5 good  and wfl FMC/dexterity noted during ADL tasks     LUE WFL 5/5 good  and wfl FMC/dexterity noted during ADL tasks       Hearing: WFL   Sensation:  No c/o numbness or tingling  Tone: WFL   Edema: yes, B LE's    Comments: Upon arrival the patient was supine. At end of session, patient was returned to supine with call light and phone within reach, all lines and tubes intact. Overall patient demonstrated decreased independence and safety during completion of ADL/functional transfer/mobility tasks. Pt would benefit from continued skilled OT to increase safety and independence with completion of ADL/IADL tasks for functional independence and quality of life. Treatment: OT treatment provided this date includes:   Instruction/training on safety and adapted techniques for completion of ADLs   Instruction/training on safe functional mobility/transfer techniques   Instruction/training on energy conservation/work simplification for completion of ADLs   Proper Positioning/Alignment    Rehab Potential: Good for established goals. Patient / Family Goal: return home       Patient and/or family were instructed on functional diagnosis, prognosis/goals and OT plan of care. Demonstrated good understanding.      Eval Complexity: Low    Time In: 1:15pm   Time Out: 1:35pm   Total Treatment Time: 0      Min Units   OT Eval Low 97165  X  1    OT Eval Medium 21545      OT Eval High 36568      OT Re-Eval [id number]            ADL/Self Care 26727     Therapeutic Activities 02312       Therapeutic Ex 82171       Orthotic Management 16780       Manual 14044     Neuro Re-Ed [id number]       Non-Billable Time        Evaluation Time additionally includes thorough review of current medical information, gathering information on past medical history/social history and prior level of function, interpretation of standardized testing/informal observation of tasks, assessment of data and development of plan of care and goals.         Evaluating OT: Kristal Whalen OTR/L; [id number]

## 2022-09-08 NOTE — PROGRESS NOTES
SPEECH/LANGUAGE PATHOLOGY  CLINICAL ASSESSMENT OF SWALLOWING FUNCTION   and PLAN OF CARE    PATIENT NAME:  Yaa Echols  (female)     MRN:  [id number]    :  1965  (62 y.o.)  STATUS:  Inpatient: Room 2324/5922-36    TODAY'S DATE:  22    SLP swallowing-dysphagia (IP)  Start:  22,   End:  22,   ONE TIME,   Standing Count:  1 Occurrences,   R         Lexi Johnson DO   REASON FOR REFERRAL: to further assess oropharyngeal function   EVALUATING THERAPIST: Reina Bah, SLP                 RESULTS:    DYSPHAGIA DIAGNOSIS:   Clinical indicators of mild  oral phase dysphagia       DIET RECOMMENDATIONS:  Regular consistency solids (IDDSI level 7) with  thin liquids (IDDSI level 0)     FEEDING RECOMMENDATIONS:     Assistance level:  No assistance needed      Compensatory strategies recommended: Small bites/sips, Alternate solids and liquids, lingual sweep to clear residuals from left buccal cavity. Discussed recommendations with nursing and/or faxed report to referring provider: Yes    SPEECH THERAPY  PLAN OF CARE   The dysphagia POC is established based on physician order, dysphagia diagnosis and results of clinical assessment     Meal time assessment for 1-2 sessions to provide diet modification and compensatory strategy implementation due to need to ensure proper implementation of compensatory strategies during PO intake     Conditions Requiring Skilled Therapeutic Intervention for dysphagia:    Patient is performing below functional baseline d/t  current acute condition, Multiple diagnoses, multiple medications, and increased dependency upon caregivers.     Specific dysphagia interventions to include:     Compensatory strategy training     Specific instructions for next treatment:  development and training of compensatory swallow strategies to improve airway protection and swallow function  Patient Treatment Goals:    Short Term Goals:  Pt will implement identified compensatory swallowing strategies on 90% of opportunities or greater to improve airway protection and swallow function. Pt will participate in meal time assessment for 1-2 sessions to provide diet modification and compensatory strategy implementation due to need to ensure proper implementation of compensatory strategies during PO intake     Long Term Goals:   Pt will maintain adequate nutrition/hydration via PO intake of the least restrictive oral diet with implementation of safe swallow/ compensatory strategies and decrease signs/symptoms of aspiration to less than 1 x/day. Pt will improve oropharyngeal swallow function to ensure airway protection during PO intake to maintain adequate nutrition/hydration and decrease signs/symptoms of aspiration to less than 1 x/day. Patient/family Goal:    Did not state. Will further assess during treatment. Plan of care discussed with Patient   The Patient understand(s) the diagnosis, prognosis and plan of care     Rehabilitation Potential/Prognosis: excellent                    ADMITTING DIAGNOSIS: Paresthesia [R20.2]  Paresthesias [R20.2]  Nonintractable headache, unspecified chronicity pattern, unspecified headache type [R51.9]    VISIT DIAGNOSIS:   Visit Diagnoses         Codes    Paresthesias    -  Primary R20.2    Nonintractable headache, unspecified chronicity pattern, unspecified headache type     R51.9             PATIENT REPORT/COMPLAINT: pain with swallowing   RN cleared patient for participation in assessment     yes     PRIOR LEVEL OF SWALLOW FUNCTION:    PAST HISTORY OF DYSPHAGIA?: none reported    Home diet: Regular consistency solids (IDDSI level 7) with  thin liquids (IDDSI level 0)  Current Diet Order:  ADULT DIET;  Regular    PROCEDURE:  Consistencies Administered During the Evaluation   Liquids: thin liquid   Solids:  pureed foods and solid foods      Method of Intake:   cup, straw, spoon  Self fed      Position:   Seated, upright    CLINICAL ASSESSMENT:  Oral Stage:       Pt is able to achieve adequate cohesive bolus prep as evidenced by satisfactory mastication patterns, timely A-P bolus propulsion, and minimal oral residuals. Pharyngeal Stage:    No signs of aspiration were noted during this evaluation however, silent aspiration cannot be ruled out at bedside. If silent aspiration is suspected, a Videofluoroscopic Study of Swallowing (MBS) is recommended and requires a physician order. Cognition:   Within functional limits for this exam    Oral Peripheral Examination   Left labiobuccal weakness    Current Respiratory Status    room air     Parameters of Speech Production  Respiration:  Adequate for speech production  Quality:   Within functional limits  Intensity: Within functional limits    Volitional Swallow: present     Volitional Cough:   present     Pain: No pain reported. EDUCATION:   The Speech Language Pathologist (SLP) completed education regarding results of evaluation and that intervention is warranted at this time. Learner: Patient  Education: Reviewed results and recommendations of this evaluation  Evaluation of Education:  Marcus Dyer understanding    This plan may be re-evaluated and revised as warranted. Evaluation Time includes thorough review of current medical information, gathering information on past medical history/social history and prior level of function, completion of standardized testing/informal observation of tasks, assessment of data and education on plan of care and goals. [x]The admitting diagnosis and active problem list, have been reviewed prior to initiation of this evaluation.         ACTIVE PROBLEM LIST:   Patient Active Problem List   Diagnosis    Vulvar abscess    Paresthesia         CPT code:  80474  bedside swallow kay Ruvalcaba M.S., [address] [facility] Pathologist  [place] [phone]

## 2022-09-08 NOTE — PROGRESS NOTES
Internal Medicine Progress Note    ERROL=[facility]    David Barakat. Kolby Sepulveda., F.SHU.MODESTOOShabanaI. Masood Messina D.O., MARISSA Laughlin D.O. Eugene Spencer D.O. Reina Guzman, MSN, APRN, NP-C  Mariusz Guzmán. Rory Silva, MSN, APRN-CNP     Primary Care Physician: Levon Reilly MD   Admitting Physician:  Pio Thapa DO  Admission date and time: 9/7/2022  4:04 PM    Room:  [facility]6291-01  Admitting diagnosis: Paresthesia [R20.2]  Paresthesias [R20.2]  Nonintractable headache, unspecified chronicity pattern, unspecified headache type [R51.9]    Patient Name: Tremayne Iglesias  MRN: [id number]    Date of Service: 9/8/2022     Subjective:  Terrance Gomez is a 62 y.o. female who was seen and examined today,9/8/2022, at the bedside. .  Patient was resting in bed. She is currently denying paresthesia of the left upper and lower extremity. Does continue paresthesia of the left face as well as left otalgia. Patient admits to headache. States at times it is painful for her to swallow secondary to the ear pain. No family present during my examination. Review of System:   Constitutional:   Denies fever or chills, weight loss or gain, mild fatigue. HEENT:   Denies ear pain, sore throat, sinus or eye problems. See subjective. Cardiovascular:   Denies any chest pain, irregular heartbeats, or palpitations. Respiratory:   Denies shortness of breath, coughing, sputum production, hemoptysis, or wheezing. Gastrointestinal:   Denies nausea, vomiting, diarrhea, or constipation. Denies any abdominal pain. Genitourinary:    Denies any urgency, frequency, hematuria. Voiding  without difficulty. Extremities:   Denies lower extremity swelling, edema or cyanosis. Neurology:    Denies any headache or focal neurological deficits, Denies generalized weakness or memory difficulty. See subjective. Psch:   Denies being anxious or depressed.   Musculoskeletal: Denies  myalgias, joint complaints or back pain. Integumentary:   Denies any rashes, ulcers, or excoriations. Denies bruising. Hematologic/Lymphatic:  Denies bruising or bleeding. Physical Exam:  I/O this shift: In: 820 [P.O.:820]  Out: -     Intake/Output Summary (Last 24 hours) at 9/8/2022 1654  Last data filed at 9/8/2022 1400  Gross per 24 hour   Intake 1820 ml   Output --   Net 1820 ml   I/O last 3 completed shifts: In: 1000 [IV Piggyback:1000]  Out: -   Patient Vitals for the past 96 hrs (Last 3 readings):   Weight   09/07/22 1556 182 lb (82.6 kg)     Vital Signs:   Blood pressure (!) 143/77, pulse 79, temperature 98.3 °F (36.8 °C), temperature source Oral, resp. rate 16, height 5' 6\" (1.676 m), weight 182 lb (82.6 kg), last menstrual period 08/01/2010, SpO2 99 %, not currently breastfeeding. General appearance:  Alert, responsive, oriented to person, place, and time. Well preserved, alert, no distress. Head:  Normocephalic. No masses, lesions or tenderness. Eyes:  PERRLA. EOMI. Sclera clear. Buccal mucosa moist.  ENT:  Ears normal. Mucosa normal.  Neck:    Supple. Trachea midline. No thyromegaly. No JVD. No bruits. Heart:    Rhythm regular. Rate controlled. No murmurs. Lungs:    Symmetrical. Clear to auscultation bilaterally. No wheezes. No rhonchi. No rales. Abdomen:   Soft. Non-tender. Non-distended. Bowel sounds positive. No organomegaly or masses. No pain on palpation. Extremities:    Peripheral pulses present. No peripheral edema. No ulcers. No cyanosis. No clubbing. Neurologic:    Alert x 3. No focal deficit. Cranial nerves grossly intact. No focal weakness. Psych:   Behavior is normal. Mood appears normal. Speech is not rapid and/or pressured. Musculoskeletal:   Spine ROM normal. Muscular strength intact. Gait not assessed. Integumentary:  No rashes  Skin normal color and texture.   Genitalia/Breast:  Deferred    Medication:  Scheduled Meds:   aspirin  81 mg Oral Daily sodium chloride flush  5-40 mL IntraVENous 2 times per day    enoxaparin  40 mg SubCUTAneous Daily    DULoxetine  60 mg Oral BID     Continuous Infusions:   dextrose      sodium chloride         Objective Data:  Recent Labs     09/07/22  1626 09/08/22  1011   WBC 8.6 7.9   RBC 4.53 4.44   HGB 14.2 14.1   HCT 41.6 40.3   MCV 91.8 90.8   MCH 31.3 31.8   MCHC 34.1 35.0*   RDW 12.3 12.1    220   MPV 9.3 9.6     Recent Labs     09/07/22  1626 09/07/22  1642 09/08/22  1011     --  139   K 4.3  --  3.6   CL 98  --  103   CO2 28  --  25   BUN 15  --  11   CREATININE 0.7  --  0.6   GLUCOSE 113* 109 147*   CALCIUM 10.3*  --  10.2   PROT  --   --  6.9   LABALBU  --   --  4.5   BILITOT  --   --  0.5   ALKPHOS  --   --  63   AST  --   --  15   ALT  --   --  24     Lab Results   Component Value Date    TROPONINI <0.01 11/08/2017          Wound Documentation:   Incision 08/08/13 Abdomen (Active)   Number of days: 1126       Incision 12/02/15 Perineum Left (Active)   Number of days: 1690       Assessment:  Strokelike symptoms  Paresthesias of the left upper and lower extremity as well as left face  Fibromyalgia  Otalgia-left  Endometriosis  Hyperlipidemia-, total cholesterol 223  Gastric reflux    Plan: Will obtain x-ray of the cervical spine as well as MRI for further evaluation of paresthesias. Toradol for headache. Echocardiogram pending. MRI revealed no acute infarct. No mass-effect or midline shift. No evidence of acute intracranial hemorrhage. Neurology consult appreciated. Recommendations reviewed with patient. Activity as tolerated.  for discharge planning. Continue current therapy. See orders for further plan of care. More than 50% of my  time was spent at the bedside counseling/coordinating care with the patient and/or family with face to face contact. This time was spent reviewing notes and laboratory data as well as instructing and counseling the patient.  Time I spent with the family or surrogate(s) is included only if the patient was incapable of providing the necessary information or participating in medical decisions. I also discussed the differential diagnosis and all of the proposed management plans with the patient and individuals accompanying the patient. The patient was seen, examined and then discussed with Dr. Maggie Lindsay. OZIEL Harden CNP,   9/8/2022  4:54 PM        I saw and evaluated the patient. I agree with the findings and the plan of care as documented in Светлана Mcarthur NP-C's  note.     Chelsea Long DO, D.O., [facility]  5:01 PM  9/8/2022

## 2022-09-08 NOTE — PROGRESS NOTES
Attempted ongoing Speech-Language Pathology intervention for Clinical Evaluation of Swallowing Function. Pt unavailable at this time due to:  [] HOLD per RN/ medical staff d/t medical status   [x] Off unit for testing/ procedure    [] With medical staff   [] Declined intervention  [] Sleeping/ Lethargic   [] Other:     SLP to continue previously established POC and re-attempt as able.     Carolyn Norris M.S., [address]  Pathologist  [phone]

## 2022-09-08 NOTE — CONSULTS
History Of Present Illness: CHIEF COMPLAINT:  numbness/pain     HISTORY OF PRESENT ILLNESS:    Patient is 14-year-old female who presented to the ED due to left-sided facial pain and numbness. Patient states that at work today she noticed left-sided facial pain and numbness. On exam it continues. She denies any loss of vision or change in vision. She does admit to associated headache. She does have a chronic intermittent left-sided and right-sided extremity numbness and pain but that is related to her fibromyalgia. As above per hospitalist.  Left facial numbness and pain mostly resolved; additionally reported bilateral ear pain that is also resolving. The patient is a 62 y.o. female with significant past medical history of see below who presents with above. The patient has the following symptoms:    Change in level of consciousness: alert    New Weakness: no    Numbness or Tingling: yes    Difficulty Swallowing: no    Current Medications:   Scheduled Meds:   aspirin  81 mg Oral Daily    sodium chloride flush  5-40 mL IntraVENous 2 times per day    enoxaparin  40 mg SubCUTAneous Daily    DULoxetine  60 mg Oral BID     Continuous Infusions:   dextrose      sodium chloride       PRN Meds:perflutren lipid microspheres, glucose, dextrose bolus **OR** dextrose bolus, glucagon (rDNA), dextrose, sodium chloride flush, sodium chloride, ondansetron **OR** ondansetron, polyethylene glycol, acetaminophen **OR** acetaminophen    Allergies:  Patient has no known allergies. Social History:   TOBACCO:   has no history on file for tobacco use. ETOH:   reports current alcohol use.     Past Medical History:        Diagnosis Date    Endometriosis     Fibromyalgia     Gastric reflux     Neuropathy        Past Surgical History:        Procedure Laterality Date     SECTION      x 2    COLONOSCOPY      HYSTERECTOMY  13    laparoscopic robotic assisted cystectomy    LAPAROSCOPY      OTHER SURGICAL HISTORY 12/2/2015    I & D left labial abcess    TUBAL LIGATION      UPPER GASTROINTESTINAL ENDOSCOPY           Outside reports reviewed: ER records, historical medical records, lab reports and radiology reports. Patient's medications, allergies, past medical, surgical, social and family histories were reviewed and updated as appropriate. Review of Systems  A comprehensive review of systems was negative except for:       Objective:     Neuro exam 142/76 p 78 t 98  General: normal orientation and alertness. Cranial nerve testing was normal.  Funduscopic eye exam revealed not testable. Motor exam: normal strength and muscle mass. Deep tendon reflexes were 1+ bilaterally. Plantar responses were flexor bilaterally. Cerebellar exam noted finger to nose without dysmetria. Sensation was normal to joint position sense, light touch, and a pin prick . Jay Pérez       Assessment:  Paresthesias of uncertain etiology in patient with history of fibromyalgia  Non focal exam with negative brain mri and negative carotid us         Plan:   If LDL> 90 start statin  Discussed daily asa and she declines--\"stomach issues\"  If symptoms persist or worsen recommend second opinion at [facility] for consult

## 2022-09-09 VITALS
SYSTOLIC BLOOD PRESSURE: 141 MMHG | RESPIRATION RATE: 16 BRPM | WEIGHT: 182 LBS | OXYGEN SATURATION: 97 % | HEIGHT: 66 IN | DIASTOLIC BLOOD PRESSURE: 75 MMHG | HEART RATE: 84 BPM | TEMPERATURE: 98.2 F | BODY MASS INDEX: 29.25 KG/M2

## 2022-09-09 LAB
ALBUMIN SERPL-MCNC: 4.5 G/DL (ref 3.5–5.2)
ALP BLD-CCNC: 75 U/L (ref 35–104)
ALT SERPL-CCNC: 24 U/L (ref 0–32)
ANION GAP SERPL CALCULATED.3IONS-SCNC: 9 MMOL/L (ref 7–16)
AST SERPL-CCNC: 16 U/L (ref 0–31)
BASOPHILS ABSOLUTE: 0.04 E9/L (ref 0–0.2)
BASOPHILS RELATIVE PERCENT: 0.6 % (ref 0–2)
BILIRUB SERPL-MCNC: 0.3 MG/DL (ref 0–1.2)
BUN BLDV-MCNC: 11 MG/DL (ref 6–20)
CALCIUM SERPL-MCNC: 9.8 MG/DL (ref 8.6–10.2)
CHLORIDE BLD-SCNC: 103 MMOL/L (ref 98–107)
CO2: 28 MMOL/L (ref 22–29)
CREAT SERPL-MCNC: 0.7 MG/DL (ref 0.5–1)
EOSINOPHILS ABSOLUTE: 0.34 E9/L (ref 0.05–0.5)
EOSINOPHILS RELATIVE PERCENT: 5.3 % (ref 0–6)
GFR AFRICAN AMERICAN: >60
GFR NON-AFRICAN AMERICAN: >60 ML/MIN/1.73
GLUCOSE BLD-MCNC: 118 MG/DL (ref 74–99)
HCT VFR BLD CALC: 43 % (ref 34–48)
HEMOGLOBIN: 14.3 G/DL (ref 11.5–15.5)
IMMATURE GRANULOCYTES #: 0.01 E9/L
IMMATURE GRANULOCYTES %: 0.2 % (ref 0–5)
LYMPHOCYTES ABSOLUTE: 1.82 E9/L (ref 1.5–4)
LYMPHOCYTES RELATIVE PERCENT: 28.2 % (ref 20–42)
MCH RBC QN AUTO: 30.9 PG (ref 26–35)
MCHC RBC AUTO-ENTMCNC: 33.3 % (ref 32–34.5)
MCV RBC AUTO: 92.9 FL (ref 80–99.9)
MONOCYTES ABSOLUTE: 0.25 E9/L (ref 0.1–0.95)
MONOCYTES RELATIVE PERCENT: 3.9 % (ref 2–12)
NEUTROPHILS ABSOLUTE: 4 E9/L (ref 1.8–7.3)
NEUTROPHILS RELATIVE PERCENT: 61.8 % (ref 43–80)
PDW BLD-RTO: 12.4 FL (ref 11.5–15)
PLATELET # BLD: 229 E9/L (ref 130–450)
PMV BLD AUTO: 9.6 FL (ref 7–12)
POTASSIUM SERPL-SCNC: 3.6 MMOL/L (ref 3.5–5)
RBC # BLD: 4.63 E12/L (ref 3.5–5.5)
SODIUM BLD-SCNC: 140 MMOL/L (ref 132–146)
TOTAL PROTEIN: 6.9 G/DL (ref 6.4–8.3)
WBC # BLD: 6.5 E9/L (ref 4.5–11.5)

## 2022-09-09 PROCEDURE — 6360000002 HC RX W HCPCS: Performed by: INTERNAL MEDICINE

## 2022-09-09 PROCEDURE — G0378 HOSPITAL OBSERVATION PER HR: HCPCS

## 2022-09-09 PROCEDURE — 36415 COLL VENOUS BLD VENIPUNCTURE: CPT

## 2022-09-09 PROCEDURE — 80053 COMPREHEN METABOLIC PANEL: CPT

## 2022-09-09 PROCEDURE — 6370000000 HC RX 637 (ALT 250 FOR IP): Performed by: INTERNAL MEDICINE

## 2022-09-09 PROCEDURE — 85025 COMPLETE CBC W/AUTO DIFF WBC: CPT

## 2022-09-09 RX ORDER — ATORVASTATIN CALCIUM 20 MG/1
20 TABLET, FILM COATED ORAL NIGHTLY
Qty: 30 TABLET | Refills: 0 | Status: SHIPPED | OUTPATIENT
Start: 2022-09-09

## 2022-09-09 RX ORDER — PREDNISONE 50 MG/1
50 TABLET ORAL
Qty: 5 TABLET | Refills: 0 | Status: SHIPPED | OUTPATIENT
Start: 2022-09-09 | End: 2022-09-14

## 2022-09-09 RX ORDER — VALACYCLOVIR HYDROCHLORIDE 1 G/1
1000 TABLET, FILM COATED ORAL 3 TIMES DAILY
Qty: 21 TABLET | Refills: 0 | Status: SHIPPED | OUTPATIENT
Start: 2022-09-09 | End: 2022-09-16

## 2022-09-09 RX ADMIN — DULOXETINE HYDROCHLORIDE 60 MG: 60 CAPSULE, DELAYED RELEASE ORAL at 08:51

## 2022-09-09 RX ADMIN — ASPIRIN 81 MG CHEWABLE TABLET 81 MG: 81 TABLET CHEWABLE at 08:51

## 2022-09-09 RX ADMIN — KETOROLAC TROMETHAMINE 30 MG: 30 INJECTION, SOLUTION INTRAMUSCULAR at 03:47

## 2022-09-09 ASSESSMENT — PAIN SCALES - GENERAL: PAINLEVEL_OUTOF10: 7

## 2022-09-09 ASSESSMENT — PAIN DESCRIPTION - LOCATION: LOCATION: EAR;HEAD

## 2022-09-09 NOTE — PROGRESS NOTES
Physical Therapy    Room #:  6166/2053-85  Patient Name: Aidan Christopher  YOB: 1965  MRN: [id number]      Date of Service: 9/9/2022    Chart reviewed. Spoke with Patient dressed and awaiting discharge, reports to be independent with bed mobility, transfers and gait. No skilled Physical Therapy needs at this time. Please re-order Physical Therapy if there is a change in physical status and Physical Therapy is needed. Thank you. Discussed outpatient rehab if ear fullness/viral inf progresses to dizziness/vertigo/imbalance.      Dilip Mariscal, PT

## 2022-09-09 NOTE — DISCHARGE SUMMARY
Internal Medicine Progress Note     ERROL=[facility]     Navin Baxter. Loly Martines., ROSAURA. Srikanth Richards D.O., MARISSA Bolden D.O. Delphine Salazar, MSN, APRN, NP-C  Angely Solis. Delroy Hernandez, MSN, [address] [facility]       Internal Medicine  Discharge Summary    NAME: Wayne Rios  :  1965  MRN:  [id number]  Inessa Estrella MD  ADMITTED: 2022      DISCHARGED: 22    ADMITTING PHYSICIAN: Navin Ramos DO    CONSULTANT(S):   IP CONSULT TO NEUROLOGY     ADMITTING DIAGNOSIS:   Paresthesia [R20.2]  Paresthesias [R20.2]  Nonintractable headache, unspecified chronicity pattern, unspecified headache type [R51.9]     DISCHARGE DIAGNOSES:   Strokelike symptoms involving paresthesias of the left face, upper and lower extremity with stroke excluded  Fibromyalgia  Left sided viral otitis externa,? Chester Ramirez syndrome? Endometriosis  Hyperlipidemia-, total cholesterol 223  Gastric reflux    BRIEF HISTORY OF PRESENT ILLNESS:   Patient is 49-year-old female who presented to the ED due to left-sided facial pain and numbness. Patient states that at work today she noticed left-sided facial pain and numbness. On exam it continues. She denies any loss of vision or change in vision. She does admit to associated headache. She does have a chronic intermittent left-sided and right-sided extremity numbness and pain but that is related to her fibromyalgia.     LABS[de-identified]  Lab Results   Component Value Date    WBC 7.9 2022    HGB 14.1 2022    HCT 40.3 2022     2022     2022    K 3.6 2022     2022    CREATININE 0.6 2022    BUN 11 2022    CO2 25 2022    GLUCOSE 147 (H) 2022    ALT 24 2022    AST 15 2022    INR 1.2 2022     Lab Results   Component Value Date    INR 1.2 2022    PROTIME 13.8 (H) 2022      Lab Results   Component Value Date    TSH 0.533 09/08/2022     Lab Results   Component Value Date    TRIG 62 09/08/2022     Lab Results   Component Value Date    HDL 74 09/08/2022     Lab Results   Component Value Date    LDLCALC 137 (H) 09/08/2022     Lab Results   Component Value Date    LABA1C 5.4 09/08/2022       IMAGING:  Echo Complete    Result Date: 9/8/2022  Transthoracic Echocardiography Report (TTE)  Demographics   Patient Name       Charmaine Lopez Gender               Female   Medical Record     [id number]       Room Number          3113  Number   Account #          [de-identified]      Procedure Date       09/08/2022   Corporate ID                      Ordering Physician   Tonya Martinez DO   Accession Number   [id number]     Referring Physician   Date of Birth      1965     Sonographer          Dameon Alvarado RDCS   Age                62 year(s)     Interpreting         Tonya Martinez DO                                    Physician                                     Any Other  Procedure Type of Study   TTE procedure:Echo Complete W/Doppler & Color Flow. Procedure Date Date: 09/08/2022 Start: 08:09 AM Study Location: Echo Lab Technical Quality: Poor visualization due to poor acoustical window. Indications:CVA. Patient Status: Routine Height: 66 inches Weight: 182 pounds BSA: 1.92 m^2 BMI: 29.38 kg/m^2 BP: 115/56 mmHg  Findings   Left Ventricle  Left ventricular size is grossly normal.  No evidence of left ventricular mass or thrombus noted. Normal left ventricular wall thickness. Ejection fraction is measured at 76%. No regional wall motion abnormalities seen. Right Ventricle  Normal right ventricular size and function. Left Atrium  Normal sized left atrium. No obvious shunting with saline   Right Atrium  Normal right atrium size. Mitral Valve  Mild mitral regurgitation is present. No evidence of mitral valve stenosis. Tricuspid Valve  Physiologic and/or trace tricuspid regurgitation. RVSP is 19.33 mmHg.    Aortic Valve  Aortic valve opens well.  The aortic valve is trileaflet. No evidence of aortic valve regurgitation. No hemodynamically significant aortic stenosis is present. Pulmonic Valve  Pulmonic valve is structurally normal.   Pericardial Effusion  No evidence of pericardial effusion. Aorta  The aorta is within normal limits. Miscellaneous  Regular rhythm. Conclusions   Summary  Left ventricular size is grossly normal.  No evidence of left ventricular mass or thrombus noted. Normal left ventricular wall thickness. Ejection fraction is measured at 76%. No regional wall motion abnormalities seen. Normal sized left atrium. No obvious shunting with saline  Mild mitral regurgitation is present. No evidence of mitral valve stenosis. Physiologic and/or trace tricuspid regurgitation. RVSP is 19.33 mmHg. Regular rhythm.    Signature   ----------------------------------------------------------------  Electronically signed by Lopez Herbert DO(Interpreting  physician) on 09/08/2022 05:48 PM  ----------------------------------------------------------------  M-Mode/2D Measurements & Calculations   LV Diastolic    LV Systolic Dimension: 2.4   AV Cusp Separation: 2.2 cmLA  Dimension: 4.4  cm                           Dimension: 3.7 cmAO Root  cm              LV Volume Diastolic: 28.8 ml Dimension: 2.9 cm  LV FS:45.5 %    LV Volume Systolic: 66.9 ml  LV PW           LV EDV/LV EDV Index: 68.0  Diastolic: 1.2  FP/72 CH/R^5AQ ESV/LV ESV  cm              Index: 21.2 ml/11ml/ m^2     RV Diastolic Dimension: 3 cm  LV PW Systolic: EF Calculated: 86.5 %        RV Systolic Dimension: 2.1 cm  1.8 cm          LV Mass Index: 94 l/min*m^2  LA/Aorta: 8.64  Septum  Diastolic: 1.1                               LA volume/Index: 61.3 ml  cm              LVOT: 2.1 cm  Septum  Systolic: 1.4  cm   LV Mass: 179.96  g  Doppler Measurements & Calculations   MV Peak E-Wave:   AV Peak Velocity: 1.38 m/s    LVOT Peak Velocity: 1.26  0.66 m/s          AV Peak Gradient: 7.61 mmHg   m/s  MV Peak A-Wave:   AV Mean Velocity: 0.99 m/s    LVOT Mean Velocity: 0.79  0.84 m/s          AV Mean Gradient: 4.3 mmHg    m/s  MV E/A Ratio:     AV VTI: 29.6 cm               LVOT Peak Gradient: 6.3  0.79              AV Area (Continuity):2.97     mmHgLVOT Mean Gradient:  MV Peak Gradient: cm^2                          2.9 mmHg  2.9 mmHg  MV Mean Gradient: LVOT VTI: 25.4 cm  1.2 mmHg  MV Mean Velocity:                               TR Velocity:2.2 m/s  0.52 m/s          Pulm. Vein A Reversal         TR Gradient:19.32 mmHg  MV Deceleration   Duration:166.1 msec           PV Peak Velocity: 1.18 m/s  Time: 197.1 msec  Pulm. Vein D Velocity:0.33    PV Peak Gradient: 5.57  MV P1/2t: 50.5    m/sPulm. Vein A Reversal      mmHg  msec              Velocity:0.35 m/s             PV Mean Velocity: 0.83 m/s  MVA by PHT:4.36   Pulm. Vein S Velocity: 0.61   PV Mean Gradient: 3.1 mmHg  cm^2              m/s  MV Area  (continuity): 3.3  cm^2  [email or web address] DocKey=[id number]    XR CERVICAL SPINE (2-3 VIEWS)    Result Date: 9/9/2022  EXAMINATION: 3 XRAY VIEWS OF THE CERVICAL SPINE 9/8/2022 6:30 pm COMPARISON: None. HISTORY: ORDERING SYSTEM PROVIDED HISTORY: pain, numbness and tingling left upper extremity and face, headache TECHNOLOGIST PROVIDED HISTORY: Reason for exam:->pain, numbness and tingling left upper extremity and face, headache FINDINGS: Vertebral body height and alignment are normal.  There are anterior spurs in the mid cervical spine. At C5-6 there is moderate disc space narrowing and posterior spurs.   Remainder of the disc spaces are normal.     Moderate degenerative change at C5-6     CT HEAD WO CONTRAST    Result Date: 9/7/2022  EXAMINATION: CT OF THE HEAD WITHOUT CONTRAST  9/7/2022 5:15 pm TECHNIQUE: CT of the head was performed without the administration of intravenous contrast. Automated exposure control, iterative reconstruction, and/or weight based adjustment of the mA/kV was utilized to reduce the radiation dose to as low as reasonably achievable. COMPARISON: None. HISTORY: ORDERING SYSTEM PROVIDED HISTORY: Left arm and leg numbness. Left facial pain. Bilateral ear pain TECHNOLOGIST PROVIDED HISTORY: Has a \"code stroke\" or \"stroke alert\" been called? ->No Reason for exam:->Left arm and leg numbness. Left facial pain. Bilateral ear pain Decision Support Exception - unselect if not a suspected or confirmed emergency medical condition->Emergency Medical Condition (MA) FINDINGS: BRAIN/VENTRICLES: There is no acute intracranial hemorrhage, mass effect or midline shift. No abnormal extra-axial fluid collection. The gray-white differentiation is maintained without evidence of an acute infarct. There is no evidence of hydrocephalus. ORBITS: The visualized portion of the orbits demonstrate no acute abnormality. SINUSES: The visualized paranasal sinuses and mastoid air cells demonstrate no acute abnormality. SOFT TISSUES/SKULL:  No acute abnormality of the visualized skull or soft tissues. No acute intracranial abnormality. MRI CERVICAL SPINE WO CONTRAST    Result Date: 9/8/2022  EXAMINATION: MRI OF THE CERVICAL SPINE WITHOUT CONTRAST 9/8/2022 2:52 pm TECHNIQUE: Multiplanar multisequence MRI of the cervical spine was performed without the administration of intravenous contrast. COMPARISON: None. HISTORY: ORDERING SYSTEM PROVIDED HISTORY: pain, numbness and tingling left upper extremity and face, headache TECHNOLOGIST PROVIDED HISTORY: Reason for exam:->pain, numbness and tingling left upper extremity and face, headache FINDINGS: BONES/ALIGNMENT: Vertebral body heights are maintained. Grade 1 degenerative anterolisthesis of C6 on C7. No suspicious marrow edema. SPINAL CORD: No abnormal cord signal is seen. SOFT TISSUES: No paraspinal mass identified.  C2-C3: There is no significant disc protrusion, spinal canal stenosis or neural foraminal narrowing. Mild facet hypertrophy. C3-C4: There is no significant disc protrusion, spinal canal stenosis or neural foraminal narrowing. Facet/uncovertebral hypertrophy. C4-C5: There is no significant disc protrusion, spinal canal stenosis or neural foraminal narrowing. Facet/uncovertebral hypertrophy. C5-C6: Mild spinal stenosis secondary to disc osteophyte complex and ligamentum flavum thickening. No significant neural foraminal stenosis. Facet/uncovertebral hypertrophy. C6-C7: Mild spinal stenosis secondary to disc osteophyte complex, spondylolisthesis, and ligamentum flavum thickening. No significant neural foraminal stenosis. Mild facet/uncovertebral hypertrophy. C7-T1: There is no significant disc protrusion, spinal canal stenosis or neural foraminal narrowing. Degenerative changes result in mild spinal stenosis at C5-6 and C6-7. No high-grade spinal stenosis or mass effect on the cord. No significant neural foraminal stenosis. XR CHEST 1 VIEW    Result Date: 9/7/2022  EXAMINATION: ONE XRAY VIEW OF THE CHEST 9/7/2022 5:20 pm COMPARISON: November 8, 2017 HISTORY: ORDERING SYSTEM PROVIDED HISTORY: Possible Stroke TECHNOLOGIST PROVIDED HISTORY: Reason for exam:->Possible Stroke FINDINGS: No airspace opacity or pleural effusion. The heart is normal size. No pneumothorax. No free air beneath the hemidiaphragms. No pneumonia or pleural effusion. MRI BRAIN WO CONTRAST    Result Date: 9/8/2022  EXAMINATION: MRI OF THE BRAIN WITHOUT CONTRAST  9/8/2022 7:13 am TECHNIQUE: Multiplanar multisequence MRI of the brain was performed without the administration of intravenous contrast. COMPARISON: None. HISTORY: ORDERING SYSTEM PROVIDED HISTORY: stroke TECHNOLOGIST PROVIDED HISTORY: Reason for exam:->stroke FINDINGS: INTRACRANIAL STRUCTURES/VENTRICLES: There is no acute infarct. No mass effect or midline shift.  No evidence of an acute intracranial hemorrhage. The ventricles and sulci are normal in size and configuration. The sellar/suprasellar regions appear unremarkable. The normal signal voids within the major intracranial vessels appear maintained. ORBITS: The visualized portion of the orbits demonstrate no acute abnormality. SINUSES: The visualized paranasal sinuses and mastoid air cells demonstrate no acute abnormality. BONES/SOFT TISSUES: The bone marrow signal intensity appears normal. The soft tissues demonstrate no acute abnormality. Unremarkable exam.  No acute ischemia. RECOMMENDATIONS: Unavailable     US CAROTID ARTERY BILATERAL    Result Date: 9/8/2022  EXAMINATION: ULTRASOUND EVALUATION OF THE CAROTID ARTERIES 9/8/2022 TECHNIQUE: Duplex ultrasound using B-mode/gray scaled imaging, Doppler spectral analysis and color flow Doppler was obtained of the carotid arteries. COMPARISON: None. HISTORY: ORDERING SYSTEM PROVIDED HISTORY: cva TECHNOLOGIST PROVIDED HISTORY: Reason for exam:->cva What reading provider will be dictating this exam?->CRC FINDINGS: RIGHT: The right common carotid artery demonstrates peak systolic velocities of 296 and 87 cm/sec in the proximal and distal segments respectively. The right internal carotid artery demonstrates the systolic velocities of 69 and 78 cm/sec in the proximal, mid and distal segments respectively. The external carotid artery is patent. The vertebral artery demonstrates normal antegrade flow. No evidence of focal atherosclerotic plaque. ICA/CCA ratio of 0.9 LEFT: The left common carotid artery demonstrates peak systolic velocities of 883 and 102 cm/sec in the proximal and distal segments respectively. The left internal carotid artery demonstrates the systolic velocities of 96 and 89 cm/sec in the proximal, mid and distal segments respectively. The external carotid artery is patent. The vertebral artery demonstrates normal antegrade flow. No evidence of focal atherosclerotic plaque.  ICA/CCA ratio of 0.9     The right internal carotid artery demonstrates 0-50% stenosis. The left internal carotid artery demonstrates 0-50% stenosis. Bilateral vertebral arteries are patent with flow in the normal direction. HOSPITAL COURSE:   Mert Ramsey did well throughout the hospitalization. She initially presented with consolation of neurologic complaints concerning for stroke. She had noted left-sided facial sensation changes as well as left upper and lower extremity sensation changes. Stroke work-up was undertaken and returned entirely negative including MRI, carotid ultrasound and echo with bubble study. Lipid panel was slightly above goal and she was started on low-dose cholesterol medication and baby aspirin. She complained of left ear pain and her examination was remarkable for focal area of erythema, tenderness and vesicular eruption consistent with viral otitis externa. 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 She has no overt facial lesions, ophthalmologic complaints and has a negative Motley sign on examination. Started on valacyclovir as well as prednisone with recommendations for close outpatient follow-up with the primary care provider. Her chronic morbidities, laboratory values and vital signs were monitored and addressed accordingly. She is acceptable for discharge home as discussed. BRIEF PHYSICAL EXAMINATION AND LABORATORIES ON DAY OF DISCHARGE:  VITALS:  BP (!) 141/75   Pulse 84   Temp 98.2 °F (36.8 °C) (Oral)   Resp 16   Ht 5' 6\" (1.676 m)   Wt 182 lb (82.6 kg)   LMP 08/01/2010   SpO2 97%   BMI 29.38 kg/m²     HEENT:  PERRLA. EOMI. bilateral external ear structures are unremarkable in appearance. The right ear canal was unremarkable as well as the tympanic membrane which is intact as well.   The left external structures are remarkable for tragus tenderness only, negative tug test.  The canal has a focal region of erythema with centrally located vesicular eruption. The tympanic membrane is without abnormal findings and is intact. Again, no extracurricular lesions, facial lesions. Negative Motley sign. Sclera clear. Conjunctiva without erythema, buccal mucosa moist.    Neck:  Supple. Trachea midline. No thyromegaly. No JVD. No bruits. Heart:  Rhythm regular, rate controlled. S1 and S2.    Lungs:  Symmetrical. Clear to auscultation bilaterally. No wheezes. No rhonchi. No rales. Abdomen: Soft. Non-tender. Non-distended. Bowel sounds positive. No organomegaly or masses. No pain on palpation    Extremities:  Peripheral pulses present. No peripheral edema. No ulcers. Neurologic:  Alert x 3. Gross neurologic examination is entirely unremarkable, no reported significant perceived sensation changes in any of the areas of complaint or overall and there is no focal deficit. Cranial nerves grossly intact. Skin:  No petechia. No hemorrhage. No wounds. DISPOSITION:  The patient's condition is good. At this time the patient is without objective evidence of an acute process requiring continuing hospitalization or inpatient management. They are stable for discharge with outpatient follow-up. I have spoken with the patient and discussed the results of the current hospitalization, in addition to providing specific details for the plan of care and counseling regarding the diagnosis and prognosis. The plan has been discussed in detail and they are aware of the specific conditions for emergent return, as well as the importance of follow-up.   Their questions are answered at this time and they are agreeable with the plan for discharge to home    DISCHARGE MEDICATIONS:   Current Discharge Medication List             Details   valACYclovir (VALTREX) 1 g tablet Take 1 tablet by mouth 3 times daily for 7 days  Qty: 21 tablet, Refills: 0      predniSONE (DELTASONE) 50 MG tablet Take 1 tablet by mouth daily (with breakfast) for 5 days  Qty: 5 tablet, Refills: 0      atorvastatin (LIPITOR) 20 MG tablet Take 1 tablet by mouth nightly  Qty: 30 tablet, Refills: 0                Details   Melatonin 10 MG TABS Take 10 mg by mouth as needed      omeprazole (PRILOSEC) 20 MG delayed release capsule Take by mouth      DULoxetine (CYMBALTA) 60 MG capsule Take 60 mg by mouth daily             FOLLOW UP/INSTRUCTIONS:  This patient is instructed to follow-up with her primary care physician. Patient is instructed to follow-up with the consults listed above as directed by them. she is instructed to resume home medications and take new medications as indicated in the list above. If the patient has a recurrence of symptoms, she is instructed to go to the ED. Preparing for this patient's discharge, including paperwork, orders, instructions, and meeting with patient did require > 40 minutes.     OZIEL Malik CNP     9/9/2022  8:07 AM

## 2022-09-09 NOTE — PROGRESS NOTES
CLINICAL PHARMACY NOTE: MEDS TO BEDS    Total # of Prescriptions Filled: 2   The following medications were delivered to the patient:  Valacyclovir 1 Gm  Prednisone 50 mg    Additional Documentation:   Pt declined Atorvastatin 20 mg wanted to speak to her

## 2022-09-09 NOTE — PLAN OF CARE
Problem: Pain  Goal: Verbalizes/displays adequate comfort level or baseline comfort level  9/9/2022 0036 by Naldo Michelle RN  Outcome: Progressing  9/8/2022 2151 by Jesus Lester RN  Outcome: Progressing

## 2022-09-10 LAB
EKG ATRIAL RATE: 80 BPM
EKG P AXIS: 13 DEGREES
EKG P-R INTERVAL: 158 MS
EKG Q-T INTERVAL: 384 MS
EKG QRS DURATION: 94 MS
EKG QTC CALCULATION (BAZETT): 442 MS
EKG R AXIS: 42 DEGREES
EKG T AXIS: 22 DEGREES
EKG VENTRICULAR RATE: 80 BPM

## 2023-07-28 ENCOUNTER — HOSPITAL ENCOUNTER (OUTPATIENT)
Dept: MAMMOGRAPHY | Age: 58
Discharge: HOME | End: 2023-07-28
Attending: OBSTETRICS & GYNECOLOGY
Payer: COMMERCIAL

## 2023-07-28 VITALS — HEIGHT: 66 IN | BODY MASS INDEX: 30.7 KG/M2 | WEIGHT: 191 LBS

## 2023-07-28 DIAGNOSIS — Z12.31 ENCOUNTER FOR SCREENING MAMMOGRAM FOR MALIGNANT NEOPLASM OF BREAST: ICD-10-CM

## 2023-07-28 PROCEDURE — 77063 BREAST TOMOSYNTHESIS BI: CPT

## 2023-10-31 PROBLEM — J45.30 UNCONTROLLED MILD PERSISTENT ASTHMA: Status: ACTIVE | Noted: 2023-10-31

## 2023-10-31 PROBLEM — R20.2 PARESTHESIA: Status: RESOLVED | Noted: 2022-09-07 | Resolved: 2023-10-31

## 2023-10-31 PROBLEM — M79.7 FIBROMYALGIA SYNDROME: Status: ACTIVE | Noted: 2023-10-31

## 2023-10-31 PROBLEM — K21.9 GASTROESOPHAGEAL REFLUX DISEASE WITHOUT ESOPHAGITIS: Status: ACTIVE | Noted: 2023-10-31

## 2023-10-31 PROBLEM — Z23 NEED FOR PROPHYLACTIC VACCINATION AND INOCULATION AGAINST INFLUENZA: Status: ACTIVE | Noted: 2023-10-31

## 2023-10-31 PROBLEM — R29.90 STROKE-LIKE SYMPTOMS: Status: RESOLVED | Noted: 2022-09-08 | Resolved: 2023-10-31

## 2024-04-06 PROBLEM — E66.9 MILD OBESITY: Status: ACTIVE | Noted: 2024-04-06

## 2024-04-06 PROBLEM — Z01.89 RESPIRATORY CLEARANCE EXAMINATION, ENCOUNTER FOR: Status: ACTIVE | Noted: 2024-04-06

## 2024-05-06 PROBLEM — Z01.89 RESPIRATORY CLEARANCE EXAMINATION, ENCOUNTER FOR: Status: RESOLVED | Noted: 2024-04-06 | Resolved: 2024-05-06

## 2025-01-29 ENCOUNTER — HOSPITAL ENCOUNTER (OUTPATIENT)
Dept: MAMMOGRAPHY | Age: 60
Discharge: HOME | End: 2025-01-31
Attending: OBSTETRICS & GYNECOLOGY
Payer: COMMERCIAL

## 2025-01-29 DIAGNOSIS — Z12.31 VISIT FOR SCREENING MAMMOGRAM: ICD-10-CM

## 2025-01-29 PROCEDURE — 77063 BREAST TOMOSYNTHESIS BI: CPT
